# Patient Record
Sex: FEMALE | Race: WHITE
[De-identification: names, ages, dates, MRNs, and addresses within clinical notes are randomized per-mention and may not be internally consistent; named-entity substitution may affect disease eponyms.]

---
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
## 2019-03-19 ENCOUNTER — HOSPITAL ENCOUNTER (OUTPATIENT)
Dept: HOSPITAL 53 - M RAD | Age: 19
End: 2019-03-19
Attending: NURSE PRACTITIONER
Payer: COMMERCIAL

## 2019-03-19 DIAGNOSIS — Q63.1: Primary | ICD-10-CM

## 2019-03-20 NOTE — REP
Clinical:  History of horseshoe kidney.

 

Technique:  Real time gray scale and color evaluation using curved array

transducer.

 

Findings:

Horseshoe kidney is appreciated.  The right moiety measures 14.5 x 4.1 x 4.2 cm.

The left moiety measures 12.3 x 5.4 x 2.8 cm.  The kidneys appeared joint at the

midline inferior poles.  No hydronephrosis, nephrolithiasis, cystic or mass

lesion appreciated.  Bladder is under distended.

 

Impression:

Relatively normal horseshoe kidney appreciated.  No hydronephrosis.

 

 

Electronically Signed by

Surjit Panda MD 03/20/2019 05:05 A

## 2019-04-01 ENCOUNTER — HOSPITAL ENCOUNTER (OUTPATIENT)
Dept: HOSPITAL 53 - M SMT | Age: 19
End: 2019-04-01
Attending: NURSE PRACTITIONER
Payer: COMMERCIAL

## 2019-04-01 DIAGNOSIS — N20.0: Primary | ICD-10-CM

## 2019-04-08 ENCOUNTER — HOSPITAL ENCOUNTER (EMERGENCY)
Dept: HOSPITAL 53 - M ED | Age: 19
Discharge: LEFT BEFORE DISCHARGE | End: 2019-04-08
Payer: COMMERCIAL

## 2019-04-08 VITALS
WEIGHT: 168.34 LBS | HEIGHT: 62 IN | DIASTOLIC BLOOD PRESSURE: 73 MMHG | SYSTOLIC BLOOD PRESSURE: 136 MMHG | BODY MASS INDEX: 30.98 KG/M2

## 2019-04-08 DIAGNOSIS — Z53.21: Primary | ICD-10-CM

## 2019-09-04 ENCOUNTER — HOSPITAL ENCOUNTER (OUTPATIENT)
Dept: HOSPITAL 53 - M SMT | Age: 19
End: 2019-09-04
Attending: OBSTETRICS & GYNECOLOGY
Payer: COMMERCIAL

## 2019-09-04 ENCOUNTER — HOSPITAL ENCOUNTER (OUTPATIENT)
Dept: HOSPITAL 53 - M LAB REF | Age: 19
End: 2019-09-04
Attending: OBSTETRICS & GYNECOLOGY
Payer: COMMERCIAL

## 2019-09-04 DIAGNOSIS — Z11.3: Primary | ICD-10-CM

## 2019-09-04 DIAGNOSIS — N97.9: Primary | ICD-10-CM

## 2019-09-04 LAB
CHLAMYDIA DNA AMPLIFICATION: NEGATIVE
EST. AVERAGE GLUCOSE BLD GHB EST-MCNC: 111 MG/DL (ref 60–110)
N GONORRHOEA RRNA SPEC QL NAA+PROBE: NEGATIVE
T4 FREE SERPL-MCNC: 1.08 NG/DL (ref 0.78–1.33)
TSH SERPL DL<=0.005 MIU/L-ACNC: 1.79 UIU/ML (ref 0.46–3.98)

## 2019-10-01 ENCOUNTER — HOSPITAL ENCOUNTER (OUTPATIENT)
Dept: HOSPITAL 53 - M RADPRO | Age: 19
End: 2019-10-01
Attending: OBSTETRICS & GYNECOLOGY
Payer: COMMERCIAL

## 2019-10-01 DIAGNOSIS — N97.9: Primary | ICD-10-CM

## 2019-10-01 LAB — B-HCG SERPL QL: NEGATIVE

## 2019-10-01 PROCEDURE — 36415 COLL VENOUS BLD VENIPUNCTURE: CPT

## 2019-10-01 PROCEDURE — 84703 CHORIONIC GONADOTROPIN ASSAY: CPT

## 2019-10-01 PROCEDURE — 58340 CATHETER FOR HYSTEROGRAPHY: CPT

## 2019-10-01 PROCEDURE — 74740 X-RAY FEMALE GENITAL TRACT: CPT

## 2019-10-01 NOTE — REP
Hysterosalpingogram:

 

History:  Infertility.  Horseshoe kidney noted on recent renal sonography.

 

Procedure:  Sequential fluoroscopic spot radiographs are obtained.  The

endometrium is cannulated and contrast was injected by the referring

gynecologist.  Fluoroscopy time is 0.4 minutes.

 

Findings:  Sequential spot radiographs document a normal endometrium with uterus

tipped somewhat to the left.  The isthmic and ampullary segments of the fallopian

tubes are symmetrically opacified and normal.  Bilateral prompt peritoneal

spillage is observed.

 

Impression:

 

Normal hysterosalpingogram. Bilateral tubal patency is observed and documented.

 

 

Electronically Signed by

Mani Alavrez MD 10/01/2019 03:34 P

## 2020-03-04 ENCOUNTER — HOSPITAL ENCOUNTER (OUTPATIENT)
Dept: HOSPITAL 53 - M SFHCCLAY | Age: 20
End: 2020-03-04
Attending: FAMILY MEDICINE
Payer: COMMERCIAL

## 2020-03-04 DIAGNOSIS — R11.0: Primary | ICD-10-CM

## 2020-03-04 LAB
ALBUMIN SERPL BCG-MCNC: 4.4 GM/DL (ref 3.2–5.2)
ALT SERPL W P-5'-P-CCNC: 24 U/L (ref 12–78)
BASOPHILS # BLD AUTO: 0.1 10^3/UL (ref 0–0.2)
BASOPHILS NFR BLD AUTO: 0.7 % (ref 0–1)
BILIRUB SERPL-MCNC: 0.7 MG/DL (ref 0.2–1)
BUN SERPL-MCNC: 6 MG/DL (ref 7–18)
CALCIUM SERPL-MCNC: 9.4 MG/DL (ref 8.5–10.1)
CHLORIDE SERPL-SCNC: 103 MEQ/L (ref 98–107)
CO2 SERPL-SCNC: 26 MEQ/L (ref 21–32)
CREAT SERPL-MCNC: 0.66 MG/DL (ref 0.55–1.3)
EOSINOPHIL # BLD AUTO: 0.1 10^3/UL (ref 0–0.5)
EOSINOPHIL NFR BLD AUTO: 0.7 % (ref 0–3)
GFR SERPL CREATININE-BSD FRML MDRD: > 60 ML/MIN/{1.73_M2} (ref 60–?)
GLUCOSE SERPL-MCNC: 78 MG/DL (ref 70–100)
HCT VFR BLD AUTO: 42.2 % (ref 36–47)
HGB BLD-MCNC: 14 G/DL (ref 12–15.5)
LIPASE SERPL-CCNC: 87 U/L (ref 73–393)
LYMPHOCYTES # BLD AUTO: 2.3 10^3/UL (ref 1.5–5)
LYMPHOCYTES NFR BLD AUTO: 22.4 % (ref 24–44)
MCH RBC QN AUTO: 32.2 PG (ref 27–33)
MCHC RBC AUTO-ENTMCNC: 33.2 G/DL (ref 32–36.5)
MCV RBC AUTO: 97 FL (ref 80–96)
MONOCYTES # BLD AUTO: 0.7 10^3/UL (ref 0–0.8)
MONOCYTES NFR BLD AUTO: 6.8 % (ref 0–5)
NEUTROPHILS # BLD AUTO: 7.2 10^3/UL (ref 1.5–8.5)
NEUTROPHILS NFR BLD AUTO: 69 % (ref 36–66)
PLATELET # BLD AUTO: 321 10^3/UL (ref 150–450)
POTASSIUM SERPL-SCNC: 3.7 MEQ/L (ref 3.5–5.1)
PROT SERPL-MCNC: 8.1 GM/DL (ref 6.4–8.2)
RBC # BLD AUTO: 4.35 10^6/UL (ref 4–5.4)
SODIUM SERPL-SCNC: 138 MEQ/L (ref 136–145)
WBC # BLD AUTO: 10.4 10^3/UL (ref 4–10)

## 2020-03-24 ENCOUNTER — HOSPITAL ENCOUNTER (EMERGENCY)
Dept: HOSPITAL 53 - M ED | Age: 20
Discharge: HOME | End: 2020-03-24
Payer: COMMERCIAL

## 2020-03-24 VITALS — BODY MASS INDEX: 28.44 KG/M2 | HEIGHT: 62 IN | WEIGHT: 154.54 LBS

## 2020-03-24 VITALS — DIASTOLIC BLOOD PRESSURE: 62 MMHG | SYSTOLIC BLOOD PRESSURE: 110 MMHG

## 2020-03-24 DIAGNOSIS — Q63.1: ICD-10-CM

## 2020-03-24 DIAGNOSIS — N20.0: Primary | ICD-10-CM

## 2020-03-24 DIAGNOSIS — Z88.0: ICD-10-CM

## 2020-03-24 DIAGNOSIS — Z79.899: ICD-10-CM

## 2020-03-24 LAB
ALBUMIN SERPL BCG-MCNC: 4.1 GM/DL (ref 3.2–5.2)
ALT SERPL W P-5'-P-CCNC: 37 U/L (ref 12–78)
BASOPHILS # BLD AUTO: 0.1 10^3/UL (ref 0–0.2)
BASOPHILS NFR BLD AUTO: 0.4 % (ref 0–1)
BILIRUB CONJ SERPL-MCNC: 0.1 MG/DL (ref 0–0.2)
BILIRUB SERPL-MCNC: 0.6 MG/DL (ref 0.2–1)
EOSINOPHIL # BLD AUTO: 0.1 10^3/UL (ref 0–0.5)
EOSINOPHIL NFR BLD AUTO: 0.5 % (ref 0–3)
HCT VFR BLD AUTO: 43 % (ref 36–47)
HGB BLD-MCNC: 14.4 G/DL (ref 12–15.5)
LIPASE SERPL-CCNC: 89 U/L (ref 73–393)
LYMPHOCYTES # BLD AUTO: 2.1 10^3/UL (ref 1.5–5)
LYMPHOCYTES NFR BLD AUTO: 17.4 % (ref 24–44)
MCH RBC QN AUTO: 33 PG (ref 27–33)
MCHC RBC AUTO-ENTMCNC: 33.5 G/DL (ref 32–36.5)
MCV RBC AUTO: 98.4 FL (ref 80–96)
MONOCYTES # BLD AUTO: 0.8 10^3/UL (ref 0–0.8)
MONOCYTES NFR BLD AUTO: 7 % (ref 0–5)
NEUTROPHILS # BLD AUTO: 8.9 10^3/UL (ref 1.5–8.5)
NEUTROPHILS NFR BLD AUTO: 74.3 % (ref 36–66)
PLATELET # BLD AUTO: 279 10^3/UL (ref 150–450)
PROT SERPL-MCNC: 7.7 GM/DL (ref 6.4–8.2)
RBC # BLD AUTO: 4.37 10^6/UL (ref 4–5.4)
WBC # BLD AUTO: 12 10^3/UL (ref 4–10)

## 2020-03-24 PROCEDURE — 85025 COMPLETE CBC W/AUTO DIFF WBC: CPT

## 2020-03-24 PROCEDURE — 74177 CT ABD & PELVIS W/CONTRAST: CPT

## 2020-03-24 PROCEDURE — 99284 EMERGENCY DEPT VISIT MOD MDM: CPT

## 2020-03-24 PROCEDURE — 80076 HEPATIC FUNCTION PANEL: CPT

## 2020-03-24 PROCEDURE — 96361 HYDRATE IV INFUSION ADD-ON: CPT

## 2020-03-24 PROCEDURE — 80047 BASIC METABLC PNL IONIZED CA: CPT

## 2020-03-24 PROCEDURE — 96374 THER/PROPH/DIAG INJ IV PUSH: CPT

## 2020-03-24 PROCEDURE — 83690 ASSAY OF LIPASE: CPT

## 2020-03-24 PROCEDURE — 87086 URINE CULTURE/COLONY COUNT: CPT

## 2020-03-24 PROCEDURE — 96375 TX/PRO/DX INJ NEW DRUG ADDON: CPT

## 2020-03-24 PROCEDURE — 84702 CHORIONIC GONADOTROPIN TEST: CPT

## 2020-03-24 PROCEDURE — 81001 URINALYSIS AUTO W/SCOPE: CPT

## 2020-03-24 NOTE — REP
Clinical:  Abdominal pain with nausea and vomiting.

 

Technique:  Axial contrast enhanced images from the lung bases to the pubic

symphysis with coronal and sagittal re-formations using 100 ml Isovue 370

intravenous contrast material.

 

Findings:

Lung bases are clear.  Visualized heart and pericardium normal.

 

Liver, spleen, pancreas, gallbladder, and bilateral adrenal glands are normal.

Congenital horseshoe kidney noted with findings to suggest hydronephrosis

involving the left renal moiety and 2 mm nonobstructing left renal calculus.  No

acute perinephric stranding noted.

 

The enteric system is without obstruction or acute inflammatory process.

Appendicolith noted within otherwise normal appearing appendix.  Pelvis

demonstrates normal bladder and age-appropriate uterus/adnexa.  No pelvic fluid

or ascites.  No free air.  No adenopathy.  Abdominal aorta and vasculature

normal.  Musculoskeletal structures demonstrate congenital changes at the level

of the lower lumbar spine and proximal sacrum including spina bifida occulta.

 

Impression:

1.  Horseshoe kidney with mild hydronephrosis of the left renal moiety and 2 mm

nonobstructing calculus.  No acute perinephric stranding or hydroureter noted.

2.  No acute abdominopelvic pathology appreciated.  No ascites, focal

inflammatory stranding, or adenopathy.

 

 

Electronically Signed by

Surjit Panda MD 03/24/2020 02:50 P

## 2020-04-21 ENCOUNTER — HOSPITAL ENCOUNTER (OUTPATIENT)
Dept: HOSPITAL 53 - M LABSMTC | Age: 20
End: 2020-04-21
Attending: FAMILY MEDICINE
Payer: COMMERCIAL

## 2020-04-21 DIAGNOSIS — Z20.828: ICD-10-CM

## 2020-04-21 DIAGNOSIS — Z11.59: Primary | ICD-10-CM

## 2020-08-04 ENCOUNTER — HOSPITAL ENCOUNTER (OUTPATIENT)
Dept: HOSPITAL 53 - M SFHCWAGY | Age: 20
End: 2020-08-04
Attending: OBSTETRICS & GYNECOLOGY
Payer: COMMERCIAL

## 2020-08-04 DIAGNOSIS — N97.9: Primary | ICD-10-CM

## 2020-11-16 ENCOUNTER — HOSPITAL ENCOUNTER (OUTPATIENT)
Dept: HOSPITAL 53 - M PLALAB | Age: 20
End: 2020-11-16
Attending: OBSTETRICS & GYNECOLOGY
Payer: COMMERCIAL

## 2020-11-16 DIAGNOSIS — Z34.91: Primary | ICD-10-CM

## 2020-11-16 LAB
CHLAMYDIA DNA AMPLIFICATION: NEGATIVE
HCT VFR BLD AUTO: 40.1 % (ref 36–47)
HCV AB SER QL: 0.1 INDEX (ref ?–0.8)
HGB BLD-MCNC: 13.3 G/DL (ref 12–15.5)
HIV 1+2 AB+HIV1 P24 AG SERPL QL IA: NEGATIVE
MCH RBC QN AUTO: 32.8 PG (ref 27–33)
MCHC RBC AUTO-ENTMCNC: 33.2 G/DL (ref 32–36.5)
MCV RBC AUTO: 99 FL (ref 80–96)
N GONORRHOEA RRNA SPEC QL NAA+PROBE: NEGATIVE
PLATELET # BLD AUTO: 303 10^3/UL (ref 150–450)
RBC # BLD AUTO: 4.05 10^6/UL (ref 4–5.4)
WBC # BLD AUTO: 11.1 10^3/UL (ref 4–10)

## 2021-01-06 ENCOUNTER — HOSPITAL ENCOUNTER (OUTPATIENT)
Dept: HOSPITAL 53 - M WHC | Age: 21
End: 2021-01-06
Attending: ADVANCED PRACTICE MIDWIFE
Payer: COMMERCIAL

## 2021-01-06 DIAGNOSIS — Z34.01: Primary | ICD-10-CM

## 2021-01-06 DIAGNOSIS — Z3A.20: ICD-10-CM

## 2021-01-06 NOTE — REP
INDICATION:

ANATOMY



COMPARISON:

None.



TECHNIQUE:

Transabdominal obstetrical ultrasound with color Doppler evaluation.



FINDINGS:

Examination demonstrates a single live intrauterine pregnancy in transverse

presentation.  Fetal motion is identified by technologist.  Placenta is noted anterior

and  grade 0 without evidence for placenta previa or abruption.  Amniotic fluid volume

is normal.  Cervix measures 3.6 cm in length and appears closed..



Gestational age by LMP 20 weeks 6 days with FELICIANO 05/20/2021.

Gestational age by current measurements 20 weeks 5 days with FELICIANO 05/21/2021.



FHR equals 144 beats per minute.



BPD:      4.9 cm twenty weeks 5 days

HC:         18.4 cm 20 weeks 5 days

AC:         15.7 cm 20 weeks 6 days

FL:          3.4 cm 20 weeks 5 days

HL:          3.2 cm 20 weeks 6 days

HC/AC: 1.18

Estimated fetal weight 377 grams (38thpercentile).



Anatomical assessment demonstrates normal structures including cranium, choroid

plexus, cavum, cerebellum/posterior fossa, facial profile, diaphragm, stomach, cord

insertion/three-vessel cord, kidneys/bladder, spine, and extremities.



IMPRESSION:

1. Single live intrauterine pregnancy in transverse lie demonstrating appropriate

estimated fetal weight and growth.

2. Limited evaluation of the nose/lips, heart and ventricular outflow tracts noted.

Remainder of the anatomical assessment is complete and normal.





<Electronically signed by Surjit Panda > 01/06/21 [id number]

## 2021-02-05 ENCOUNTER — HOSPITAL ENCOUNTER (OUTPATIENT)
Dept: HOSPITAL 53 - M WHC | Age: 21
End: 2021-02-05
Attending: ADVANCED PRACTICE MIDWIFE
Payer: COMMERCIAL

## 2021-02-05 DIAGNOSIS — Z3A.25: ICD-10-CM

## 2021-02-05 DIAGNOSIS — Z34.91: Primary | ICD-10-CM

## 2021-02-06 NOTE — REP
INDICATION:

F/U ANATOMY



COMPARISON:

01/06/2021



TECHNIQUE:

Transabdominal obstetrical ultrasound with color Doppler evaluation.



FINDINGS:

Examination demonstrates a single live intrauterine pregnancy in cephalic

presentation.  Fetal motion is identified by technologist.  Placenta is noted anterior

and  grade 1 without evidence for placenta previa or abruption.  Amniotic fluid volume

is normal.  Cervix measures 3.2 cm in length and appears closed..



Gestational age by LMP 25 weeks 1 day with FELICIANO 05/20/2021.

Gestational age by current measurements 25 weeks 5 days with FELICIANO 05/16/2021.



FHR equals 139 beats per minute.



Estimated fetal weight 865 grams (73rdpercentile).



Anatomical assessment demonstrates normal structures including nose/lips,

heart/ventricular outflow tracts.



IMPRESSION:

Single live intrauterine pregnancy in cephalic presentation demonstrating appropriate

estimated fetal weight and growth.  In conjunction with prior examination anatomical

assessment is complete and normal.





<Electronically signed by Surjit Panda > 02/06/21 [id number]

## 2021-02-23 ENCOUNTER — HOSPITAL ENCOUNTER (OUTPATIENT)
Dept: HOSPITAL 53 - M PLALAB | Age: 21
End: 2021-02-23
Attending: ADVANCED PRACTICE MIDWIFE
Payer: COMMERCIAL

## 2021-02-23 DIAGNOSIS — Z3A.23: Primary | ICD-10-CM

## 2021-02-23 LAB
HCT VFR BLD AUTO: 36.1 % (ref 36–47)
HGB BLD-MCNC: 11.5 G/DL (ref 12–15.5)
MCH RBC QN AUTO: 31.9 PG (ref 27–33)
MCHC RBC AUTO-ENTMCNC: 31.9 G/DL (ref 32–36.5)
MCV RBC AUTO: 100.3 FL (ref 80–96)
PLATELET # BLD AUTO: 312 10^3/UL (ref 150–450)
RBC # BLD AUTO: 3.6 10^6/UL (ref 4–5.4)
WBC # BLD AUTO: 15.3 10^3/UL (ref 4–10)

## 2021-03-04 ENCOUNTER — HOSPITAL ENCOUNTER (OUTPATIENT)
Dept: HOSPITAL 53 - M LAB REF | Age: 21
End: 2021-03-04
Attending: PHYSICIAN ASSISTANT
Payer: COMMERCIAL

## 2021-03-04 DIAGNOSIS — J02.9: Primary | ICD-10-CM

## 2021-04-07 ENCOUNTER — HOSPITAL ENCOUNTER (INPATIENT)
Dept: HOSPITAL 53 - M LDO | Age: 21
LOS: 3 days | Discharge: HOME | DRG: 560 | End: 2021-04-10
Attending: ADVANCED PRACTICE MIDWIFE | Admitting: ADVANCED PRACTICE MIDWIFE
Payer: COMMERCIAL

## 2021-04-07 VITALS — DIASTOLIC BLOOD PRESSURE: 85 MMHG | SYSTOLIC BLOOD PRESSURE: 119 MMHG

## 2021-04-07 VITALS — HEIGHT: 62 IN | WEIGHT: 182.1 LBS | BODY MASS INDEX: 33.51 KG/M2

## 2021-04-07 DIAGNOSIS — Z3A.33: ICD-10-CM

## 2021-04-07 LAB
AMPHETAMINES UR QL SCN: NEGATIVE
BARBITURATES UR QL SCN: NEGATIVE
BENZODIAZ UR QL SCN: NEGATIVE
BZE UR QL SCN: NEGATIVE
CANNABINOIDS UR QL SCN: (no result)
HCT VFR BLD AUTO: 31.7 % (ref 36–47)
HGB BLD-MCNC: 10.6 G/DL (ref 12–15.5)
MCH RBC QN AUTO: 31.5 PG (ref 27–33)
MCHC RBC AUTO-ENTMCNC: 33.4 G/DL (ref 32–36.5)
MCV RBC AUTO: 94.3 FL (ref 80–96)
METHADONE UR QL SCN: NEGATIVE
OPIATES UR QL SCN: NEGATIVE
PCP UR QL SCN: NEGATIVE
PLATELET # BLD AUTO: 299 10^3/UL (ref 150–450)
RBC # BLD AUTO: 3.36 10^6/UL (ref 4–5.4)
WBC # BLD AUTO: 13.9 10^3/UL (ref 4–10)

## 2021-04-07 RX ADMIN — BETAMETHASONE SODIUM PHOSPHATE AND BETAMETHASONE ACETATE SCH MG: 3; 3 INJECTION, SUSPENSION INTRA-ARTICULAR; INTRALESIONAL; INTRAMUSCULAR at 19:32

## 2021-04-07 RX ADMIN — SODIUM CHLORIDE, POTASSIUM CHLORIDE, SODIUM LACTATE AND CALCIUM CHLORIDE SCH MLS/HR: 600; 310; 30; 20 INJECTION, SOLUTION INTRAVENOUS at 22:36
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
## 2021-04-08 VITALS — SYSTOLIC BLOOD PRESSURE: 122 MMHG | DIASTOLIC BLOOD PRESSURE: 75 MMHG

## 2021-04-08 VITALS — DIASTOLIC BLOOD PRESSURE: 57 MMHG | SYSTOLIC BLOOD PRESSURE: 113 MMHG

## 2021-04-08 VITALS — SYSTOLIC BLOOD PRESSURE: 126 MMHG | DIASTOLIC BLOOD PRESSURE: 58 MMHG

## 2021-04-08 VITALS — DIASTOLIC BLOOD PRESSURE: 55 MMHG | SYSTOLIC BLOOD PRESSURE: 113 MMHG

## 2021-04-08 VITALS — SYSTOLIC BLOOD PRESSURE: 116 MMHG | DIASTOLIC BLOOD PRESSURE: 67 MMHG

## 2021-04-08 VITALS — DIASTOLIC BLOOD PRESSURE: 64 MMHG | SYSTOLIC BLOOD PRESSURE: 112 MMHG

## 2021-04-08 VITALS — DIASTOLIC BLOOD PRESSURE: 59 MMHG | SYSTOLIC BLOOD PRESSURE: 114 MMHG

## 2021-04-08 VITALS — DIASTOLIC BLOOD PRESSURE: 56 MMHG | SYSTOLIC BLOOD PRESSURE: 105 MMHG

## 2021-04-08 VITALS — SYSTOLIC BLOOD PRESSURE: 113 MMHG | DIASTOLIC BLOOD PRESSURE: 52 MMHG

## 2021-04-08 VITALS — SYSTOLIC BLOOD PRESSURE: 110 MMHG | DIASTOLIC BLOOD PRESSURE: 52 MMHG

## 2021-04-08 VITALS — SYSTOLIC BLOOD PRESSURE: 122 MMHG | DIASTOLIC BLOOD PRESSURE: 69 MMHG

## 2021-04-08 VITALS — SYSTOLIC BLOOD PRESSURE: 166 MMHG | DIASTOLIC BLOOD PRESSURE: 104 MMHG

## 2021-04-08 VITALS — SYSTOLIC BLOOD PRESSURE: 120 MMHG | DIASTOLIC BLOOD PRESSURE: 71 MMHG

## 2021-04-08 VITALS — DIASTOLIC BLOOD PRESSURE: 67 MMHG | SYSTOLIC BLOOD PRESSURE: 123 MMHG

## 2021-04-08 VITALS — SYSTOLIC BLOOD PRESSURE: 116 MMHG | DIASTOLIC BLOOD PRESSURE: 62 MMHG

## 2021-04-08 VITALS — DIASTOLIC BLOOD PRESSURE: 52 MMHG | SYSTOLIC BLOOD PRESSURE: 113 MMHG

## 2021-04-08 VITALS — DIASTOLIC BLOOD PRESSURE: 69 MMHG | SYSTOLIC BLOOD PRESSURE: 128 MMHG

## 2021-04-08 VITALS — DIASTOLIC BLOOD PRESSURE: 57 MMHG | SYSTOLIC BLOOD PRESSURE: 107 MMHG

## 2021-04-08 VITALS — DIASTOLIC BLOOD PRESSURE: 59 MMHG | SYSTOLIC BLOOD PRESSURE: 109 MMHG

## 2021-04-08 VITALS — DIASTOLIC BLOOD PRESSURE: 60 MMHG | SYSTOLIC BLOOD PRESSURE: 120 MMHG

## 2021-04-08 LAB
BASE EXCESS BLDCOA CALC-SCNC: -8.3 MMOL/L
BASE EXCESS BLDCOV CALC-SCNC: -7.2 MMOL/L
CO2 BLDCOA CALC-SCNC: 21.2 MEQ/L
CO2 BLDCOV CALC-SCNC: 20.1 MEQ/L
HCO3 STD BLDCOA-SCNC: 17 MEQ/L
HCO3 STD BLDCOA-SCNC: 19.7 MEQ/L
HCO3 STD BLDCOV-SCNC: 18.3 MEQ/L
HCO3 STD BLDCOV-SCNC: 18.9 MEQ/L
PCO2 BLDCOA: 49.5 MMHG
PCO2 BLDCOV: 40.2 MMHG
PH BLDCOA: 7.22 UNITS
PH BLDCOV: 7.29 UNITS
PO2 BLDCOA: 24.5 MMHG
PO2 BLDCOV: 35.5 MMHG
SAO2 % BLDCOA: 54.7 %
SAO2 % BLDCOV: 80.2 %

## 2021-04-08 RX ADMIN — CEFAZOLIN SODIUM SCH MLS/HR: 1 INJECTION, POWDER, FOR SOLUTION INTRAMUSCULAR; INTRAVENOUS at 03:30

## 2021-04-08 RX ADMIN — SODIUM CHLORIDE, POTASSIUM CHLORIDE, SODIUM LACTATE AND CALCIUM CHLORIDE SCH MLS/HR: 600; 310; 30; 20 INJECTION, SOLUTION INTRAVENOUS at 19:22

## 2021-04-08 RX ADMIN — SODIUM CHLORIDE, POTASSIUM CHLORIDE, SODIUM LACTATE AND CALCIUM CHLORIDE SCH MLS/HR: 600; 310; 30; 20 INJECTION, SOLUTION INTRAVENOUS at 06:15

## 2021-04-08 RX ADMIN — CEFAZOLIN SODIUM SCH MLS/HR: 1 INJECTION, POWDER, FOR SOLUTION INTRAMUSCULAR; INTRAVENOUS at 11:32

## 2021-04-08 RX ADMIN — SODIUM CHLORIDE, POTASSIUM CHLORIDE, SODIUM LACTATE AND CALCIUM CHLORIDE SCH MLS/HR: 600; 310; 30; 20 INJECTION, SOLUTION INTRAVENOUS at 14:30

## 2021-04-08 RX ADMIN — BETAMETHASONE SODIUM PHOSPHATE AND BETAMETHASONE ACETATE SCH MG: 3; 3 INJECTION, SUSPENSION INTRA-ARTICULAR; INTRALESIONAL; INTRAMUSCULAR at 06:44

## 2021-04-08 RX ADMIN — CEFAZOLIN SODIUM SCH MLS/HR: 1 INJECTION, POWDER, FOR SOLUTION INTRAMUSCULAR; INTRAVENOUS at 19:33

## 2021-04-08 NOTE — DNPDOC
[facility] Delivery Note


Delivery Note


DATE OF DELIVERY: 2021





TIME OF DELIVERY: 2240





Spontaneous vaginal delivery, 34+0 weeks.  





OBSTETRICIAN: Dr. Seferino Perez DO FACOG





ANESTHESIA: Epidural





LACERATION: None





ESTIMATED BLOOD LOSS: 300 mL.





FINDINGS: 5 pound 2 ounce (2338g) Male infant, Apgar Score 6 and 7.  Cord gases 

sent.





DELIVERY SUMMARY: The active phase and second stage of labor progressed in 

normal fashion.  She received Pitocin augmentation throughout her labor course. 

She also received two doses of betamethasone.  No maternal fever throughout the 

labor.  The head delivered in the MARY position, and restituted LOT.  No nuchal 

cord was noted.  The anterior shoulder delivered with gentle downward guidance 

and the remainder of the body delivered with ease.  The baby was placed on the 

patient's chest.  Delayed cord clamping occurred for approximately 1 minute.  

The cord was then doubly clamped and cut.  IV Pitocin was bolused to actively 

manage the third stage of labor.  The placenta delivered intact without any 

difficulty within 10 minutes of delivery.  The uterine fundus was noted to be 

firm and 2 cm below the umbilicus.  Cytotec 1000mg MN administered to maintain 

uterine tone.  The cervix, vagina, vulva and perineum were inspected.  No 

lacerations.  Excellent hemostasis was noted.  Sponge, needle and instrument 

counts were correct per protocol.





DO AKOSUA Ro JONATHAN R. DO            2021 23:14

## 2021-04-08 NOTE — IPNPDOC
Obstetrical Progress Note


Date of Service


Apr 8, 2021





Subjective


Patient has become more uncomfortable with her contractions.  Continues to have 

low level LOF.  No VB.  Denies f/c/n/v/HA/sob/cp





Objective





Vital Signs








  Date Time  Temp Pulse Resp B/P (MAP) Pulse Ox O2 Delivery O2 Flow Rate FiO2


 


4/8/21 17:22 98.9 72 16 123/67 (85)    











Fetal Assessment


Fetal Heart Rate Tracing:  Category I





Tocometer


Frequency:  every 2-5 min.





Sterile Vaginal Examination


Dilation:  3 cm


Effacement (%):  90%


Station:  -2


Cervical Consistency:  Soft


Cervical Position:  Anterior


Fetal Postion/Presentation:  Cephalic presentation





Assessment and Plan


Additional Comments


Continue with Pitocin IOL.  Overall reassuring maternal and fetal status.











FLORECITA ALEXANDER DO            Apr 8, 2021 17:51

## 2021-04-08 NOTE — IPNPDOC
Obstetrical Progress Note


Date of Service


Apr 8, 2021





Subjective


Patient is not feeling any contractions.  No VB.  +continuous leakage of fluid. 

No f/c/n/v/HA/visual changes/RUQ pain/sob/cp.





Objective





Vital Signs








  Date Time  Temp Pulse Resp B/P (MAP) Pulse Ox O2 Delivery O2 Flow Rate FiO2


 


4/8/21 07:40 99.1 62 16 112/64 (80)    











Fetal Assessment


Fetal Heart Rate (FHR):  110


Variability:  Moderate


Decelerations:  Variable, Intermittent


Fetal Heart Rate Tracing:  Category I





Tocometer


Contractions:  No





Sterile Vaginal Examination


Dilation:  1cm


Effacement (%):  70%


Station:  -3


Cervical Consistency:  Soft


Cervical Position:  Anterior


Fetal Postion/Presentation:  Cephalic presentation





Assessment and Plan


Fetal Status:  Reassuring


Group B Streptococcus:  Unknown


Additional Comments


PPROM.  34+ weeks.  Patient has received two doses of Betamethasone.  Cephalic 

presentation. Start with Pitocin IOL.











FLORECITA ALEXANDER DO            Apr 8, 2021 10:04

## 2021-04-08 NOTE — IPNPDOC
Obstetrical Progress Note


Date of Service


Apr 8, 2021





Subjective


Patient is comfortable with epidural.  No heavy VB.  No f/c/n/v/HA/sob/cp.





Objective





Vital Signs








  Date Time  Temp Pulse Resp B/P (MAP) Pulse Ox O2 Delivery O2 Flow Rate FiO2


 


4/8/21 18:21 99.3 77 20 126/58 (80)    











Fetal Assessment


Fetal Heart Rate (FHR):  110


Variability:  Moderate


Decelerations:  Variable, Intermittent





Tocometer


Contractions:  Yes


Frequency:  every 1-5 min.





Sterile Vaginal Examination


Dilation:  complete


Effacement (%):  100%


Station:  +2





Assessment and Plan


Fetal Status:  Reassuring (moderate variability)


Anticipate:  Vaginal Delivery











FLORECITA ALEXANDER DO            Apr 8, 2021 22:36

## 2021-04-09 VITALS — SYSTOLIC BLOOD PRESSURE: 129 MMHG | DIASTOLIC BLOOD PRESSURE: 79 MMHG

## 2021-04-09 VITALS — DIASTOLIC BLOOD PRESSURE: 66 MMHG | SYSTOLIC BLOOD PRESSURE: 122 MMHG

## 2021-04-09 VITALS — DIASTOLIC BLOOD PRESSURE: 59 MMHG | SYSTOLIC BLOOD PRESSURE: 120 MMHG

## 2021-04-09 RX ADMIN — Medication SCH TAB: at 07:42

## 2021-04-09 NOTE — IPNPDOC
Postpartum Progress Note


Date of Service:  2021


Postpartum Day#:  1


Postpartum Progress Note


SUBJECT: Status post .  She has been ambulating, voiding spontaneously 

without issue and tolerating regular diet.  Lochia decreasing/minimal. Pain is 

well-controlled.  Denies headache, visual changes, right upper quadrant pain, 

shortness breath or chest pain.





OBJECTIVE: 


VITAL SIGNS: Within normal limits, afebrile.


Alert and oriented times three.


Abdomen: Fundus firm at U-2. Soft, NTTP.








ASSESSMENT: Status post uncomplicated spontaneous vaginal delivery.  Vitals 

within normal limits, afebrile, hemodynamically stable with no evidence of 

infection.





PLAN:


Discharge to home tomorrow.


Tylenol and Motrin for pain.


Routine postpartum instructions/precautions reviewed.


Routine PP visit in 6 weeks in clinic.





RODNEY Perez DO





VS, I&O, 24H, Mirna


Vital Signs/I&O





Vital Signs








  Date Time  Temp Pulse Resp B/P (MAP) Pulse Ox O2 Delivery O2 Flow Rate FiO2


 


21 06:00 98.7 88 18 120/59 (79)    


 


21 00:57     100 Room Air  














I&O- Last 24 Hours up to 6 AM 


 


 21





 06:00


 


Intake Total 1976 ml


 


Output Total 1500 ml


 


Balance 476 ml











Laboratory Data


24H LABS


Laboratory Tests 2


21 22:53: 


Cord Venous Blood pH 7.290, Cord Venous Blood PCO2 40.2, Cord Venous Blood PO2 

35.5, Cord Venous Blood HCO3 18.9, Cord Venous Blood Total CO2 20.1, Cord Venous

Base Excess (Actual) -7.2, Cord Venous Base Excess (Standard) 18.3, Cord Venous 

Blood Oxygen Saturation 80.2


21 22:54: 


Cord Arterial Blood pH 7.217, Cord Arterial Blood PCO2 49.5, Cord Arterial Blood

PO2 24.5, Cord Arterial Blood HCO3 19.7, Cord Arterial Blood Total CO2 21.2, 

Cord Arterial Blood Base Excess -8.3, Cord Arterial Base Excess (Standard 17.0, 

Cord Arterial Bld Oxygen Saturation 54.7


Microbiology





Microbiology


21 Group B Streptococcus Screen (CORAL), Received


         Pending











FLORECITA PEREZ DO            2021 07:16

## 2021-04-10 VITALS — DIASTOLIC BLOOD PRESSURE: 75 MMHG | SYSTOLIC BLOOD PRESSURE: 124 MMHG

## 2021-04-10 RX ADMIN — Medication SCH TAB: at 08:53

## 2021-04-11 LAB — THC UR CFM-MCNC: 22 NG/ML

## 2021-09-16 ENCOUNTER — HOSPITAL ENCOUNTER (OUTPATIENT)
Dept: HOSPITAL 53 - M SFHCCLAY | Age: 21
End: 2021-09-16
Attending: PHYSICIAN ASSISTANT
Payer: COMMERCIAL

## 2021-09-16 DIAGNOSIS — R05: Primary | ICD-10-CM

## 2021-09-27 ENCOUNTER — HOSPITAL ENCOUNTER (OUTPATIENT)
Dept: HOSPITAL 53 - M PLALAB | Age: 21
End: 2021-09-27
Attending: OBSTETRICS & GYNECOLOGY
Payer: COMMERCIAL

## 2021-09-27 DIAGNOSIS — Z36.89: ICD-10-CM

## 2021-09-27 DIAGNOSIS — Z34.91: Primary | ICD-10-CM

## 2021-09-27 LAB
ANISOCYTOSIS BLD QL SMEAR: (no result)
BASOPHILS NFR BLD MANUAL: 3 % (ref 0–1)
BURR CELLS BLD QL SMEAR: (no result)
DACRYOCYTES BLD QL SMEAR: (no result)
HCT VFR BLD AUTO: 37.8 % (ref 36–47)
HCV AB SER QL: 0.1 INDEX (ref ?–0.8)
HGB BLD-MCNC: 11.8 G/DL (ref 12–15.5)
HIV 1+2 AB+HIV1 P24 AG SERPL QL IA: NEGATIVE
HYPOCHROMIA BLD QL SMEAR: (no result)
LYMPHOCYTES NFR BLD MANUAL: 38 % (ref 16–44)
MCH RBC QN AUTO: 26.4 PG (ref 27–33)
MCHC RBC AUTO-ENTMCNC: 31.2 G/DL (ref 32–36.5)
MCV RBC AUTO: 84.6 FL (ref 80–96)
MONOCYTES NFR BLD MANUAL: 4 % (ref 0–5)
N GONORRHOEA RRNA SPEC QL NAA+PROBE: NEGATIVE
NEUTROPHILS NFR BLD MANUAL: 54 % (ref 28–66)
OVALOCYTES BLD QL SMEAR: (no result)
PLATELET # BLD AUTO: 424 10^3/UL (ref 150–450)
PLATELET BLD QL SMEAR: NORMAL
PLATELET CLUMP BLD QL SMEAR: (no result)
POLYCHROMASIA BLD QL SMEAR: (no result)
RBC # BLD AUTO: 4.47 10^6/UL (ref 4–5.4)
SMUDGE CELLS BLD QL SMEAR: (no result)
VARIANT LYMPHS NFR BLD MANUAL: 1 % (ref 0–5)
WBC # BLD AUTO: 16.8 10^3/UL (ref 4–10)

## 2021-11-05 ENCOUNTER — HOSPITAL ENCOUNTER (OUTPATIENT)
Dept: HOSPITAL 53 - M WHC | Age: 21
End: 2021-11-05
Attending: OBSTETRICS & GYNECOLOGY
Payer: COMMERCIAL

## 2021-11-05 DIAGNOSIS — Z36.89: ICD-10-CM

## 2021-11-05 DIAGNOSIS — Z34.82: Primary | ICD-10-CM

## 2021-11-05 DIAGNOSIS — Z3A.20: ICD-10-CM

## 2021-11-05 NOTE — REP
INDICATION:

ANATOMY.



COMPARISON:

None.



TECHNIQUE:

Real-time sonographic evaluation of the gravid uterus performed.



FINDINGS:

Estimated gestational age is20 weeks 0 days, EDC 03/25/2022.  Today's measurements

indicate appropriate growth.



Presentation: Transverse

Placenta anterior, grade 1, without evidence of placenta previa.

Fetal heart rate is recorded at 150 beats per minute.

Amniotic fluid is subjectively normal.

Closed cervical length is measured at 3.5 cm.



Biometry chart:



BPD: 45 mm, 19 weeks 4 days, 38th percentile.

HC: 175 mm, 20 weeks 0 days, 51st percentile

AC: 146 mm, 19 weeks 6 days, 49th percentile

Femur length: 33 mm, 20 weeks 2 days, 58th percentile

HC to AC ratio: 1.19, normal range 1.06-1.25.



Estimated fetal weight: 329g



Fetal anatomy:



Cranium: Grossly normal

Lateral Ventricles/Choroid Plexus: Grossly normal

Posterior Fossa/Cerebellum: Grossly normal

Nose/lips/profile: Grossly normal

Four chamber heart: Grossly normal

Right ventricular outflow tract: Grossly normal

Left ventricular outflow tract: Grossly normal

Left-sided stomach: Grossly normal

Kidneys: Grossly normal

Bladder: Grossly normal

Cord Insertion: Grossly normal

3 vessel cord: Grossly normal

Spine: Grossly normal



IMPRESSION:

Viable single intrauterine gestation as above.





<Electronically signed by Norberto Coelho > 11/05/21 [id number]

## 2021-12-23 ENCOUNTER — HOSPITAL ENCOUNTER (OUTPATIENT)
Dept: HOSPITAL 53 - M PLALAB | Age: 21
End: 2021-12-23
Attending: OBSTETRICS & GYNECOLOGY
Payer: COMMERCIAL

## 2021-12-23 DIAGNOSIS — Z36.89: ICD-10-CM

## 2021-12-23 DIAGNOSIS — O09.892: Primary | ICD-10-CM

## 2021-12-23 LAB
HCT VFR BLD AUTO: 30.9 % (ref 36–47)
HGB BLD-MCNC: 9.6 G/DL (ref 12–15.5)
MCH RBC QN AUTO: 27.3 PG (ref 27–33)
MCHC RBC AUTO-ENTMCNC: 31.1 G/DL (ref 32–36.5)
MCV RBC AUTO: 87.8 FL (ref 80–96)
PLATELET # BLD AUTO: 335 10^3/UL (ref 150–450)
RBC # BLD AUTO: 3.52 10^6/UL (ref 4–5.4)
WBC # BLD AUTO: 16.7 10^3/UL (ref 4–10)

## 2022-01-13 ENCOUNTER — HOSPITAL ENCOUNTER (OUTPATIENT)
Dept: HOSPITAL 53 - M LAB | Age: 22
End: 2022-01-13
Attending: OBSTETRICS & GYNECOLOGY
Payer: COMMERCIAL

## 2022-01-13 DIAGNOSIS — O99.810: Primary | ICD-10-CM

## 2022-01-30 ENCOUNTER — HOSPITAL ENCOUNTER (OUTPATIENT)
Dept: HOSPITAL 53 - M LDO | Age: 22
Discharge: HOME | End: 2022-01-30
Attending: OBSTETRICS & GYNECOLOGY
Payer: COMMERCIAL

## 2022-01-30 VITALS — DIASTOLIC BLOOD PRESSURE: 68 MMHG | SYSTOLIC BLOOD PRESSURE: 138 MMHG

## 2022-01-30 VITALS — DIASTOLIC BLOOD PRESSURE: 79 MMHG | SYSTOLIC BLOOD PRESSURE: 135 MMHG

## 2022-01-30 VITALS — SYSTOLIC BLOOD PRESSURE: 136 MMHG | DIASTOLIC BLOOD PRESSURE: 85 MMHG

## 2022-01-30 VITALS — SYSTOLIC BLOOD PRESSURE: 136 MMHG | DIASTOLIC BLOOD PRESSURE: 77 MMHG

## 2022-01-30 VITALS — BODY MASS INDEX: 36.92 KG/M2 | WEIGHT: 200.62 LBS | HEIGHT: 62 IN

## 2022-01-30 DIAGNOSIS — N13.30: ICD-10-CM

## 2022-01-30 DIAGNOSIS — Z88.0: ICD-10-CM

## 2022-01-30 DIAGNOSIS — Z79.899: ICD-10-CM

## 2022-01-30 DIAGNOSIS — O26.833: Primary | ICD-10-CM

## 2022-01-30 DIAGNOSIS — Z3A.32: ICD-10-CM

## 2022-01-30 DIAGNOSIS — Z20.822: ICD-10-CM

## 2022-01-30 LAB
ALBUMIN SERPL BCG-MCNC: 2 GM/DL (ref 3.2–5.2)
ALT SERPL W P-5'-P-CCNC: 12 U/L (ref 12–78)
BILIRUB SERPL-MCNC: 0.2 MG/DL (ref 0.2–1)
BUN SERPL-MCNC: 10 MG/DL (ref 7–18)
CALCIUM SERPL-MCNC: 8.4 MG/DL (ref 8.5–10.1)
CHLORIDE SERPL-SCNC: 105 MEQ/L (ref 98–107)
CO2 SERPL-SCNC: 23 MEQ/L (ref 21–32)
CREAT SERPL-MCNC: 0.81 MG/DL (ref 0.55–1.3)
GFR SERPL CREATININE-BSD FRML MDRD: > 60 ML/MIN/{1.73_M2} (ref 60–?)
GLUCOSE SERPL-MCNC: 103 MG/DL (ref 70–100)
HCT VFR BLD AUTO: 27 % (ref 36–47)
HCT VFR BLD AUTO: 27.1 % (ref 36–47)
HGB BLD-MCNC: 8.3 G/DL (ref 12–15.5)
HGB BLD-MCNC: 8.3 G/DL (ref 12–15.5)
MCH RBC QN AUTO: 26.5 PG (ref 27–33)
MCH RBC QN AUTO: 26.7 PG (ref 27–33)
MCHC RBC AUTO-ENTMCNC: 30.6 G/DL (ref 32–36.5)
MCHC RBC AUTO-ENTMCNC: 30.7 G/DL (ref 32–36.5)
MCV RBC AUTO: 86.3 FL (ref 80–96)
MCV RBC AUTO: 87.1 FL (ref 80–96)
PLATELET # BLD AUTO: 349 10^3/UL (ref 150–450)
PLATELET # BLD AUTO: 384 10^3/UL (ref 150–450)
POTASSIUM SERPL-SCNC: 3.8 MEQ/L (ref 3.5–5.1)
PROT SERPL-MCNC: 6.1 GM/DL (ref 6.4–8.2)
RBC # BLD AUTO: 3.11 10^6/UL (ref 4–5.4)
RBC # BLD AUTO: 3.13 10^6/UL (ref 4–5.4)
SODIUM SERPL-SCNC: 138 MEQ/L (ref 136–145)
WBC # BLD AUTO: 20.8 10^3/UL (ref 4–10)
WBC # BLD AUTO: 23.6 10^3/UL (ref 4–10)

## 2022-01-30 PROCEDURE — 81001 URINALYSIS AUTO W/SCOPE: CPT

## 2022-01-30 PROCEDURE — 82731 ASSAY OF FETAL FIBRONECTIN: CPT

## 2022-01-30 PROCEDURE — 82365 CALCULUS SPECTROSCOPY: CPT

## 2022-01-30 PROCEDURE — 87426 SARSCOV CORONAVIRUS AG IA: CPT

## 2022-01-30 PROCEDURE — 96375 TX/PRO/DX INJ NEW DRUG ADDON: CPT

## 2022-01-30 PROCEDURE — 76820 UMBILICAL ARTERY ECHO: CPT

## 2022-01-30 PROCEDURE — 80053 COMPREHEN METABOLIC PANEL: CPT

## 2022-01-30 PROCEDURE — 96361 HYDRATE IV INFUSION ADD-ON: CPT

## 2022-01-30 PROCEDURE — 87070 CULTURE OTHR SPECIMN AEROBIC: CPT

## 2022-01-30 PROCEDURE — 76815 OB US LIMITED FETUS(S): CPT

## 2022-01-30 PROCEDURE — 76819 FETAL BIOPHYS PROFIL W/O NST: CPT

## 2022-01-30 PROCEDURE — 59025 FETAL NON-STRESS TEST: CPT

## 2022-01-30 PROCEDURE — 87205 SMEAR GRAM STAIN: CPT

## 2022-01-30 PROCEDURE — 87086 URINE CULTURE/COLONY COUNT: CPT

## 2022-01-30 PROCEDURE — 76775 US EXAM ABDO BACK WALL LIM: CPT

## 2022-01-30 PROCEDURE — 96374 THER/PROPH/DIAG INJ IV PUSH: CPT

## 2022-01-30 PROCEDURE — 85027 COMPLETE CBC AUTOMATED: CPT

## 2022-01-30 PROCEDURE — 36415 COLL VENOUS BLD VENIPUNCTURE: CPT

## 2022-02-18 ENCOUNTER — HOSPITAL ENCOUNTER (OUTPATIENT)
Dept: HOSPITAL 53 - M SFHCWAGY | Age: 22
End: 2022-02-18
Attending: OBSTETRICS & GYNECOLOGY
Payer: COMMERCIAL

## 2022-02-18 DIAGNOSIS — O09.893: Primary | ICD-10-CM

## 2022-02-18 DIAGNOSIS — Z36.85: ICD-10-CM

## 2022-08-17 ENCOUNTER — HOSPITAL ENCOUNTER (OUTPATIENT)
Dept: HOSPITAL 53 - M PLALAB | Age: 22
End: 2022-08-17
Attending: OBSTETRICS & GYNECOLOGY
Payer: COMMERCIAL

## 2022-08-17 DIAGNOSIS — Z01.419: Primary | ICD-10-CM

## 2022-08-17 DIAGNOSIS — N89.8: ICD-10-CM

## 2022-08-17 DIAGNOSIS — R87.610: ICD-10-CM

## 2023-06-02 ENCOUNTER — HOSPITAL ENCOUNTER (OUTPATIENT)
Dept: HOSPITAL 53 - M SFHCWAGY | Age: 23
End: 2023-06-02
Attending: NURSE PRACTITIONER
Payer: COMMERCIAL

## 2023-06-02 DIAGNOSIS — N89.8: Primary | ICD-10-CM

## 2024-02-15 ENCOUNTER — HOSPITAL ENCOUNTER (OUTPATIENT)
Dept: HOSPITAL 53 - M PLALAB | Age: 24
End: 2024-02-15
Attending: OBSTETRICS & GYNECOLOGY
Payer: COMMERCIAL

## 2024-02-15 DIAGNOSIS — Z11.3: ICD-10-CM

## 2024-02-15 DIAGNOSIS — Z12.4: Primary | ICD-10-CM

## 2024-02-15 LAB
CHLAMYDIA DNA AMPLIFICATION: NEGATIVE
N GONORRHOEA RRNA SPEC QL NAA+PROBE: NEGATIVE

## 2024-08-05 ENCOUNTER — HOSPITAL ENCOUNTER (OUTPATIENT)
Dept: HOSPITAL 53 - M SFHCCLAY | Age: 24
End: 2024-08-05
Attending: NURSE PRACTITIONER
Payer: COMMERCIAL

## 2024-08-05 DIAGNOSIS — L28.2: Primary | ICD-10-CM

## 2024-08-05 DIAGNOSIS — E66.9: ICD-10-CM

## 2024-08-05 DIAGNOSIS — I77.6: ICD-10-CM

## 2024-08-05 DIAGNOSIS — K21.9: ICD-10-CM

## 2024-08-05 DIAGNOSIS — F41.0: ICD-10-CM

## 2024-08-05 LAB
ALBUMIN SERPL BCG-MCNC: 3.7 G/DL (ref 3.2–5.2)
ALP SERPL-CCNC: 107 U/L (ref 46–116)
ALT SERPL W P-5'-P-CCNC: 18 U/L (ref 7–40)
AST SERPL-CCNC: 13 U/L (ref ?–34)
BASOPHILS # BLD AUTO: 0.1 10^3/UL (ref 0–0.2)
BASOPHILS NFR BLD AUTO: 0.9 % (ref 0–1)
BILIRUB SERPL-MCNC: 0.2 MG/DL (ref 0.3–1.2)
BUN SERPL-MCNC: 11 MG/DL (ref 9–23)
CALCIUM SERPL-MCNC: 8.9 MG/DL (ref 8.5–10.1)
CHLORIDE SERPL-SCNC: 106 MMOL/L (ref 98–107)
CHOLEST SERPL-MCNC: 194 MG/DL (ref ?–200)
CHOLEST/HDLC SERPL: 3.39 {RATIO} (ref ?–5)
CO2 SERPL-SCNC: 30 MMOL/L (ref 20–31)
CREAT SERPL-MCNC: 0.68 MG/DL (ref 0.55–1.3)
EOSINOPHIL # BLD AUTO: 0.4 10^3/UL (ref 0–0.5)
EOSINOPHIL NFR BLD AUTO: 4.6 % (ref 0–3)
EST. AVERAGE GLUCOSE BLD GHB EST-MCNC: 108 MG/DL (ref 60–110)
GFR SERPL CREATININE-BSD FRML MDRD: > 60 ML/MIN/{1.73_M2} (ref 60–?)
GLUCOSE SERPL-MCNC: 72 MG/DL (ref 60–100)
HCT VFR BLD AUTO: 35.8 % (ref 36–47)
HDLC SERPL-MCNC: 57.1 MG/DL (ref 40–?)
HGB BLD-MCNC: 10.7 G/DL (ref 12–15.5)
LDLC SERPL CALC-MCNC: 107.9 MG/DL (ref ?–100)
LYMPHOCYTES # BLD AUTO: 3.2 10^3/UL (ref 1.5–5)
LYMPHOCYTES NFR BLD AUTO: 36.8 % (ref 24–44)
MCH RBC QN AUTO: 23.9 PG (ref 27–33)
MCHC RBC AUTO-ENTMCNC: 29.9 G/DL (ref 32–36.5)
MCV RBC AUTO: 80.1 FL (ref 80–96)
MONOCYTES # BLD AUTO: 0.6 10^3/UL (ref 0–0.8)
MONOCYTES NFR BLD AUTO: 7.1 % (ref 2–8)
NEUTROPHILS # BLD AUTO: 4.4 10^3/UL (ref 1.5–8.5)
NEUTROPHILS NFR BLD AUTO: 50.1 % (ref 36–66)
NONHDLC SERPL-MCNC: 136.9 MG/DL
PLATELET # BLD AUTO: 366 10^3/UL (ref 150–450)
POTASSIUM SERPL-SCNC: 4.5 MMOL/L (ref 3.5–5.1)
PROT SERPL-MCNC: 7.3 G/DL (ref 5.7–8.2)
RBC # BLD AUTO: 4.47 10^6/UL (ref 4–5.4)
SODIUM SERPL-SCNC: 139 MMOL/L (ref 136–145)
T4 FREE SERPL-MCNC: 1.13 NG/DL (ref 0.89–1.76)
TRIGL SERPL-MCNC: 145 MG/DL (ref ?–150)
TSH SERPL DL<=0.005 MIU/L-ACNC: 1.42 UIU/ML (ref 0.55–4.78)
WBC # BLD AUTO: 8.8 10^3/UL (ref 4–10)

## 2024-08-06 ENCOUNTER — HOSPITAL ENCOUNTER (OUTPATIENT)
Dept: HOSPITAL 53 - M SFHCCLAY | Age: 24
End: 2024-08-06
Attending: NURSE PRACTITIONER
Payer: COMMERCIAL

## 2024-08-06 DIAGNOSIS — D64.9: Primary | ICD-10-CM

## 2024-08-06 LAB
FERRITIN SERPL-MCNC: 1 NG/ML (ref 7.3–270.7)
FOLATE SERPL-MCNC: 15.82 NG/ML (ref 5.4–?)
IRON SATN MFR SERPL: 4.1 % (ref 13.2–45)
IRON SERPL-MCNC: 18 UG/DL (ref 50–170)
TIBC SERPL-MCNC: 434 UG/DL (ref 250–425)
VIT B12 SERPL-MCNC: 617 PG/ML (ref 211–911)

## 2025-03-20 ENCOUNTER — HOSPITAL ENCOUNTER (OUTPATIENT)
Dept: HOSPITAL 53 - M SFHCWAGY | Age: 25
End: 2025-03-20
Attending: OBSTETRICS & GYNECOLOGY
Payer: COMMERCIAL

## 2025-03-20 DIAGNOSIS — Z12.4: Primary | ICD-10-CM
